# Patient Record
(demographics unavailable — no encounter records)

---

## 2025-03-27 NOTE — HISTORY OF PRESENT ILLNESS
[de-identified] : 03/27/2025: CARRI EFREN, a 19 year old female, presents today for BL knee pain. States

## 2025-03-27 NOTE — DISCUSSION/SUMMARY
[de-identified] : General Dx Discussion The patient was advised of the diagnosis. The natural history of the pathology was explained in full to the patient in layman's terms. All questions were answered. The risks and benefits of surgical and non-surgical treatment alternatives were explained in full to the patient.  mri reviewed  will ty mobic 15 mg po qd  for a few days and ice take it for the next 5-7 days and then on an as needed basis, as long as there are  no medical contra-indications.  Patient is counseled on possible GI and blood  pressure side effects. will get a mri rt and left knee to eval for a tear  also advised to see rheumatology for eval and blood work

## 2025-03-27 NOTE — HISTORY OF PRESENT ILLNESS
[de-identified] : 03/27/2025: CARRI EFREN, a 19 year old female, presents today for BL knee pain. States  Bexarotene Pregnancy And Lactation Text: This medication is Pregnancy Category X and should not be given to women who are pregnant or may become pregnant. This medication should not be used if you are breast feeding.

## 2025-03-27 NOTE — PHYSICAL EXAM
[Bilateral] : knee bilaterally [NL (0)] : extension 0 degrees [5___] : hamstring 5[unfilled]/5 [Equivocal] : equivocal Kai [] : light touch is intact throughout [TWNoteComboBox7] : flexion 120 degrees

## 2025-03-27 NOTE — DISCUSSION/SUMMARY
[de-identified] : General Dx Discussion The patient was advised of the diagnosis. The natural history of the pathology was explained in full to the patient in layman's terms. All questions were answered. The risks and benefits of surgical and non-surgical treatment alternatives were explained in full to the patient.  mri reviewed  will ty mobic 15 mg po qd  for a few days and ice take it for the next 5-7 days and then on an as needed basis, as long as there are  no medical contra-indications.  Patient is counseled on possible GI and blood  pressure side effects. will get a mri rt and left knee to eval for a tear  also advised to see rheumatology for eval and blood work

## 2025-04-14 NOTE — PHYSICAL EXAM
[Bilateral] : knee bilaterally [NL (0)] : extension 0 degrees [5___] : hamstring 5[unfilled]/5 [Equivocal] : equivocal Kai [] : no pain with varus stress [TWNoteComboBox7] : flexion 120 degrees

## 2025-04-14 NOTE — DATA REVIEWED
[MRI] : MRI [Bilateral] : of the bilateral [Knee] : knee [Report was reviewed and noted in the chart] : The report was reviewed and noted in the chart [I independently reviewed and interpreted images and report] : I independently reviewed and interpreted images and report [I reviewed the films/CD] : I reviewed the films/CD [FreeTextEntry1] : chondromalacia  patella and quads tendinitis

## 2025-04-14 NOTE — DISCUSSION/SUMMARY
[de-identified] : General Dx Discussion The patient was advised of the diagnosis. The natural history of the pathology was explained in full to the patient in layman's terms. All questions were answered. The risks and benefits of surgical and non-surgical treatment alternatives were explained in full to the patient.  mri reviewed  will ty mobic 15 mg po qd  for a few days and ice has not taken it  will try PT  f/u 3 months

## 2025-04-14 NOTE — HISTORY OF PRESENT ILLNESS
[Result of repetitive motion] : result of repetitive motion [6] : 6 [4] : 4 [Dull/Aching] : dull/aching [Constant] : constant [Social interactions] : social interactions [Student] : Work status: student [de-identified] : 4.14.25 Today MRI review Rt > Lt knee  03/27/25: Patient is here to be evaluated and treated for the LUIS KNEES. Patient has been experiencing pain and stiffness for the last 2 months. Patient notes cracking. Patient has been treated for the LEFT KNEE by Dr. Orosco in the past for a similar issue. [] : Post Surgical Visit: no [de-identified] : College